# Patient Record
Sex: MALE | Race: BLACK OR AFRICAN AMERICAN | NOT HISPANIC OR LATINO | Employment: UNEMPLOYED | URBAN - METROPOLITAN AREA
[De-identification: names, ages, dates, MRNs, and addresses within clinical notes are randomized per-mention and may not be internally consistent; named-entity substitution may affect disease eponyms.]

---

## 2024-11-08 ENCOUNTER — HOSPITAL ENCOUNTER (EMERGENCY)
Facility: HOSPITAL | Age: 19
Discharge: HOME | End: 2024-11-08
Attending: EMERGENCY MEDICINE

## 2024-11-08 VITALS
DIASTOLIC BLOOD PRESSURE: 82 MMHG | TEMPERATURE: 98.6 F | RESPIRATION RATE: 22 BRPM | SYSTOLIC BLOOD PRESSURE: 148 MMHG | HEART RATE: 91 BPM | OXYGEN SATURATION: 100 % | WEIGHT: 144.4 LBS

## 2024-11-08 DIAGNOSIS — S91.339A PUNCTURE WOUND OF FOOT: Primary | ICD-10-CM

## 2024-11-08 PROCEDURE — 90715 TDAP VACCINE 7 YRS/> IM: CPT | Performed by: EMERGENCY MEDICINE

## 2024-11-08 PROCEDURE — 99284 EMERGENCY DEPT VISIT MOD MDM: CPT | Performed by: EMERGENCY MEDICINE

## 2024-11-08 PROCEDURE — 90471 IMMUNIZATION ADMIN: CPT

## 2024-11-08 PROCEDURE — 99283 EMERGENCY DEPT VISIT LOW MDM: CPT

## 2024-11-08 RX ORDER — CIPROFLOXACIN 750 MG/1
750 TABLET, FILM COATED ORAL EVERY 12 HOURS SCHEDULED
Qty: 14 TABLET | Refills: 0 | Status: SHIPPED | OUTPATIENT
Start: 2024-11-08 | End: 2024-11-15

## 2024-11-08 RX ADMIN — CIPROFLOXACIN 750 MG: 500 TABLET ORAL at 14:20

## 2024-11-08 RX ADMIN — TETANUS TOXOID, REDUCED DIPHTHERIA TOXOID AND ACELLULAR PERTUSSIS VACCINE, ADSORBED 0.5 ML: 5; 2.5; 8; 8; 2.5 SUSPENSION INTRAMUSCULAR at 14:20

## 2024-11-08 NOTE — ED PROVIDER NOTES
"Time reflects when diagnosis was documented in both MDM as applicable and the Disposition within this note       Time User Action Codes Description Comment    11/8/2024  1:58 PM Sylvia Moncada Add [S92.339A] Puncture wound of foot           ED Disposition       ED Disposition   Discharge    Condition   Stable    Date/Time   Fri Nov 8, 2024  1:58 PM    Comment   Juan F Wilkerson discharge to home/self care.                   Assessment & Plan       Medical Decision Making  20 y/o male with puncture wound to the L foot- will update tetanus and give cipro.     Risk  Prescription drug management.             Medications   tetanus-diphtheria-acellular pertussis (BOOSTRIX) IM injection 0.5 mL (has no administration in time range)   ciprofloxacin (CIPRO) tablet 750 mg (has no administration in time range)       ED Risk Strat Scores             CRAFFT      Flowsheet Row Most Recent Value   CRAFFT Initial Screen: During the past 12 months, did you:    1. Drink any alcohol (more than a few sips)?  No Filed at: 11/08/2024 1218   2. Smoke any marijuana or hashish No Filed at: 11/08/2024 1218   3. Use anything else to get high? (\"anything else\" includes illegal drugs, over the counter and prescription drugs, and things that you sniff or 'tomlin')? No Filed at: 11/08/2024 1218                                          History of Present Illness       Chief Complaint   Patient presents with    Foot Injury     Pt right foot pain after stepping on a nail yesterday, pt is unsure if UTD on tetanus        History reviewed. No pertinent past medical history.   History reviewed. No pertinent surgical history.   History reviewed. No pertinent family history.   Social History     Tobacco Use    Smoking status: Never    Smokeless tobacco: Never      E-Cigarette/Vaping      E-Cigarette/Vaping Substances      I have reviewed and agree with the history as documented.     20 y/o male presents to the ED for puncture wound to the left foot.  " Patient states that he sustained this yesterday while he was at work.  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  He was able to get the nail out yesterday which was intact.  He states that it did bleed but has since stopped.  He is not up-to-date on his tetanus.  Has had mild pain to the area.  Denies any swelling, drainage, or redness to the site.  Denies any fevers. No other complaints.       History provided by:  Patient      Review of Systems   Constitutional:  Negative for chills and fever.   HENT:  Negative for congestion, ear pain and sore throat.    Eyes:  Negative for pain and visual disturbance.   Respiratory:  Negative for cough, shortness of breath and wheezing.    Cardiovascular:  Negative for chest pain and leg swelling.   Gastrointestinal:  Negative for abdominal pain, diarrhea, nausea and vomiting.   Genitourinary:  Negative for dysuria, frequency, hematuria and urgency.   Musculoskeletal:  Negative for neck pain and neck stiffness.   Skin:  Positive for wound. Negative for rash.   Neurological:  Negative for weakness, numbness and headaches.   Psychiatric/Behavioral:  Negative for agitation and confusion.    All other systems reviewed and are negative.          Objective       ED Triage Vitals [11/08/24 1216]   Temperature Pulse Blood Pressure Respirations SpO2 Patient Position - Orthostatic VS   98.6 °F (37 °C) 91 148/82 22 100 % Sitting      Temp src Heart Rate Source BP Location FiO2 (%) Pain Score    -- Monitor -- -- --      Vitals      Date and Time Temp Pulse SpO2 Resp BP Pain Score FACES Pain Rating User   11/08/24 1216 98.6 °F (37 °C) 91 100 % 22 148/82 -- -- AS            Physical Exam  Vitals and nursing note reviewed.   Constitutional:       Appearance: He is well-developed.   HENT:      Head: Normocephalic and atraumatic.   Eyes:      Pupils: Pupils are equal, round, and reactive to light.   Cardiovascular:      Rate  and Rhythm: Normal rate and regular rhythm.   Pulmonary:      Effort: Pulmonary effort is normal.      Breath sounds: Normal breath sounds.   Abdominal:      General: Bowel sounds are normal.      Palpations: Abdomen is soft.   Musculoskeletal:         General: Normal range of motion.      Cervical back: Normal range of motion and neck supple.      Comments: L foot-small puncture wound to the plantar aspect of the left foot.  No bleeding, drainage, redness, or swelling noted.   Skin:     General: Skin is warm and dry.   Neurological:      General: No focal deficit present.      Mental Status: He is alert and oriented to person, place, and time.      Comments: No focal deficits         Results Reviewed       None            No orders to display       Procedures    ED Medication and Procedure Management   None     Patient's Medications   Discharge Prescriptions    CIPROFLOXACIN (CIPRO) 750 MG TABLET    Take 1 tablet (750 mg total) by mouth every 12 (twelve) hours for 7 days       Start Date: 11/8/2024 End Date: 11/15/2024       Order Dose: 750 mg       Quantity: 14 tablet    Refills: 0     No discharge procedures on file.  ED SEPSIS DOCUMENTATION   Time reflects when diagnosis was documented in both MDM as applicable and the Disposition within this note       Time User Action Codes Description Comment    11/8/2024  1:58 PM Sylvia Moncada Add [S91.339A] Puncture wound of foot                  Sylvia Moncada, DO  11/08/24 1363